# Patient Record
Sex: MALE | Race: BLACK OR AFRICAN AMERICAN
[De-identification: names, ages, dates, MRNs, and addresses within clinical notes are randomized per-mention and may not be internally consistent; named-entity substitution may affect disease eponyms.]

---

## 2019-01-15 ENCOUNTER — HOSPITAL ENCOUNTER (INPATIENT)
Dept: HOSPITAL 15 - ER | Age: 54
LOS: 6 days | Discharge: HOME | DRG: 280 | End: 2019-01-21
Attending: INTERNAL MEDICINE | Admitting: NURSE PRACTITIONER
Payer: COMMERCIAL

## 2019-01-15 VITALS — BODY MASS INDEX: 33.59 KG/M2 | WEIGHT: 209 LBS | HEIGHT: 66 IN

## 2019-01-15 VITALS — DIASTOLIC BLOOD PRESSURE: 69 MMHG | SYSTOLIC BLOOD PRESSURE: 147 MMHG

## 2019-01-15 VITALS — SYSTOLIC BLOOD PRESSURE: 147 MMHG | DIASTOLIC BLOOD PRESSURE: 67 MMHG

## 2019-01-15 DIAGNOSIS — Z87.891: ICD-10-CM

## 2019-01-15 DIAGNOSIS — I21.A1: Primary | ICD-10-CM

## 2019-01-15 DIAGNOSIS — I25.10: ICD-10-CM

## 2019-01-15 DIAGNOSIS — J96.00: ICD-10-CM

## 2019-01-15 DIAGNOSIS — Z82.49: ICD-10-CM

## 2019-01-15 DIAGNOSIS — I08.1: ICD-10-CM

## 2019-01-15 DIAGNOSIS — Z91.19: ICD-10-CM

## 2019-01-15 DIAGNOSIS — Z79.82: ICD-10-CM

## 2019-01-15 DIAGNOSIS — E11.65: ICD-10-CM

## 2019-01-15 DIAGNOSIS — I50.43: ICD-10-CM

## 2019-01-15 DIAGNOSIS — I48.91: ICD-10-CM

## 2019-01-15 DIAGNOSIS — J10.00: ICD-10-CM

## 2019-01-15 DIAGNOSIS — Z95.810: ICD-10-CM

## 2019-01-15 DIAGNOSIS — I27.20: ICD-10-CM

## 2019-01-15 DIAGNOSIS — E78.5: ICD-10-CM

## 2019-01-15 DIAGNOSIS — I25.2: ICD-10-CM

## 2019-01-15 DIAGNOSIS — I42.0: ICD-10-CM

## 2019-01-15 DIAGNOSIS — I11.0: ICD-10-CM

## 2019-01-15 DIAGNOSIS — Z79.899: ICD-10-CM

## 2019-01-15 LAB
ALBUMIN SERPL-MCNC: 3.7 G/DL (ref 3.4–5)
ALP SERPL-CCNC: 58 U/L (ref 45–117)
ALT SERPL-CCNC: 25 U/L (ref 16–61)
ANION GAP SERPL CALCULATED.3IONS-SCNC: 7 MMOL/L (ref 5–15)
APTT PPP: 30.4 SEC (ref 23.78–33.04)
BILIRUB SERPL-MCNC: 0.7 MG/DL (ref 0.2–1)
BUN SERPL-MCNC: 10 MG/DL (ref 7–18)
BUN/CREAT SERPL: 8.1
CALCIUM SERPL-MCNC: 8.7 MG/DL (ref 8.5–10.1)
CHLORIDE SERPL-SCNC: 102 MMOL/L (ref 98–107)
CO2 SERPL-SCNC: 29 MMOL/L (ref 21–32)
GLUCOSE SERPL-MCNC: 223 MG/DL (ref 74–106)
HCT VFR BLD AUTO: 51.1 % (ref 41–53)
HGB BLD-MCNC: 17.6 G/DL (ref 13.5–17.5)
INR PPP: 0.99 (ref 0.9–1.15)
MAGNESIUM SERPL-MCNC: 2 MG/DL (ref 1.6–2.6)
MCH RBC QN AUTO: 34.5 PG (ref 28–32)
MCV RBC AUTO: 100.2 FL (ref 80–100)
POTASSIUM SERPL-SCNC: 3.4 MMOL/L (ref 3.5–5.1)
PROT SERPL-MCNC: 8.1 G/DL (ref 6.4–8.2)
PROTHROMBIN TIME: 10.6 SEC (ref 9.27–12.13)
SODIUM SERPL-SCNC: 138 MMOL/L (ref 136–145)

## 2019-01-15 PROCEDURE — 36600 WITHDRAWAL OF ARTERIAL BLOOD: CPT

## 2019-01-15 PROCEDURE — 81001 URINALYSIS AUTO W/SCOPE: CPT

## 2019-01-15 PROCEDURE — 85025 COMPLETE CBC W/AUTO DIFF WBC: CPT

## 2019-01-15 PROCEDURE — 85730 THROMBOPLASTIN TIME PARTIAL: CPT

## 2019-01-15 PROCEDURE — 80053 COMPREHEN METABOLIC PANEL: CPT

## 2019-01-15 PROCEDURE — 87186 SC STD MICRODIL/AGAR DIL: CPT

## 2019-01-15 PROCEDURE — 80307 DRUG TEST PRSMV CHEM ANLYZR: CPT

## 2019-01-15 PROCEDURE — 96361 HYDRATE IV INFUSION ADD-ON: CPT

## 2019-01-15 PROCEDURE — 71045 X-RAY EXAM CHEST 1 VIEW: CPT

## 2019-01-15 PROCEDURE — 93005 ELECTROCARDIOGRAM TRACING: CPT

## 2019-01-15 PROCEDURE — 85610 PROTHROMBIN TIME: CPT

## 2019-01-15 PROCEDURE — 87040 BLOOD CULTURE FOR BACTERIA: CPT

## 2019-01-15 PROCEDURE — 87077 CULTURE AEROBIC IDENTIFY: CPT

## 2019-01-15 PROCEDURE — 96375 TX/PRO/DX INJ NEW DRUG ADDON: CPT

## 2019-01-15 PROCEDURE — 87205 SMEAR GRAM STAIN: CPT

## 2019-01-15 PROCEDURE — 84484 ASSAY OF TROPONIN QUANT: CPT

## 2019-01-15 PROCEDURE — 85007 BL SMEAR W/DIFF WBC COUNT: CPT

## 2019-01-15 PROCEDURE — 80048 BASIC METABOLIC PNL TOTAL CA: CPT

## 2019-01-15 PROCEDURE — 85027 COMPLETE CBC AUTOMATED: CPT

## 2019-01-15 PROCEDURE — 94640 AIRWAY INHALATION TREATMENT: CPT

## 2019-01-15 PROCEDURE — 83605 ASSAY OF LACTIC ACID: CPT

## 2019-01-15 PROCEDURE — 99291 CRITICAL CARE FIRST HOUR: CPT

## 2019-01-15 PROCEDURE — 87070 CULTURE OTHR SPECIMN AEROBIC: CPT

## 2019-01-15 PROCEDURE — 83036 HEMOGLOBIN GLYCOSYLATED A1C: CPT

## 2019-01-15 PROCEDURE — 36415 COLL VENOUS BLD VENIPUNCTURE: CPT

## 2019-01-15 PROCEDURE — 82805 BLOOD GASES W/O2 SATURATION: CPT

## 2019-01-15 PROCEDURE — 93306 TTE W/DOPPLER COMPLETE: CPT

## 2019-01-15 PROCEDURE — 80061 LIPID PANEL: CPT

## 2019-01-15 PROCEDURE — 87804 INFLUENZA ASSAY W/OPTIC: CPT

## 2019-01-15 PROCEDURE — 96365 THER/PROPH/DIAG IV INF INIT: CPT

## 2019-01-15 PROCEDURE — 83735 ASSAY OF MAGNESIUM: CPT

## 2019-01-15 PROCEDURE — 83880 ASSAY OF NATRIURETIC PEPTIDE: CPT

## 2019-01-15 PROCEDURE — 80162 ASSAY OF DIGOXIN TOTAL: CPT

## 2019-01-15 PROCEDURE — 82962 GLUCOSE BLOOD TEST: CPT

## 2019-01-15 RX ADMIN — Medication SCH STRIP: at 23:25

## 2019-01-15 RX ADMIN — TEMAZEPAM PRN MG: 15 CAPSULE ORAL at 23:35

## 2019-01-15 RX ADMIN — HUMAN INSULIN SCH UNITS: 100 INJECTION, SOLUTION SUBCUTANEOUS at 23:30

## 2019-01-15 RX ADMIN — FAMOTIDINE SCH MG: 20 TABLET, FILM COATED ORAL at 23:24

## 2019-01-15 RX ADMIN — ACETAMINOPHEN PRN MG: 325 TABLET ORAL at 23:24

## 2019-01-15 NOTE — NUR
Telemetry admit from ER

BHUPENDRA VALDEZ admitted to Telemetry unit.  Patient oriented to Karlene Salcido RN primary RN, 
unit, room, bed, and unit policies regarding patient care and visiting hours. Patient now on 
continuous telemetry monitoring, tele box # 8 sinus tachy 116. Patient placed on bedside 
oxygen 3L NC, weighed by bedscale and encouraged to call if they need something. All 
questions and concerns addressed, patient verbalized understanding. 

Note:

## 2019-01-16 VITALS — SYSTOLIC BLOOD PRESSURE: 134 MMHG | DIASTOLIC BLOOD PRESSURE: 87 MMHG

## 2019-01-16 VITALS — SYSTOLIC BLOOD PRESSURE: 150 MMHG | DIASTOLIC BLOOD PRESSURE: 90 MMHG

## 2019-01-16 VITALS — SYSTOLIC BLOOD PRESSURE: 117 MMHG | DIASTOLIC BLOOD PRESSURE: 74 MMHG

## 2019-01-16 VITALS — DIASTOLIC BLOOD PRESSURE: 90 MMHG | SYSTOLIC BLOOD PRESSURE: 141 MMHG

## 2019-01-16 VITALS — DIASTOLIC BLOOD PRESSURE: 74 MMHG | SYSTOLIC BLOOD PRESSURE: 117 MMHG

## 2019-01-16 VITALS — SYSTOLIC BLOOD PRESSURE: 141 MMHG | DIASTOLIC BLOOD PRESSURE: 90 MMHG

## 2019-01-16 VITALS — SYSTOLIC BLOOD PRESSURE: 140 MMHG | DIASTOLIC BLOOD PRESSURE: 82 MMHG

## 2019-01-16 LAB
ALBUMIN SERPL-MCNC: 3.4 G/DL (ref 3.4–5)
ALCOHOL, URINE: < 3 MG/DL (ref 0–5)
ALP SERPL-CCNC: 52 U/L (ref 45–117)
ALT SERPL-CCNC: 28 U/L (ref 16–61)
AMPHETAMINES UR QL SCN: NEGATIVE
ANION GAP SERPL CALCULATED.3IONS-SCNC: 6 MMOL/L (ref 5–15)
BARBITURATES UR QL SCN: NEGATIVE
BENZODIAZ UR QL SCN: NEGATIVE
BILIRUB SERPL-MCNC: 0.6 MG/DL (ref 0.2–1)
BUN SERPL-MCNC: 14 MG/DL (ref 7–18)
BUN/CREAT SERPL: 11
BZE UR QL SCN: NEGATIVE
CALCIUM SERPL-MCNC: 8.5 MG/DL (ref 8.5–10.1)
CANNABINOIDS UR QL SCN: NEGATIVE
CHLORIDE SERPL-SCNC: 102 MMOL/L (ref 98–107)
CO2 SERPL-SCNC: 29 MMOL/L (ref 21–32)
GLUCOSE SERPL-MCNC: 171 MG/DL (ref 74–106)
HCT VFR BLD AUTO: 46.7 % (ref 41–53)
HGB BLD-MCNC: 16.3 G/DL (ref 13.5–17.5)
MCH RBC QN AUTO: 35 PG (ref 28–32)
MCV RBC AUTO: 100.2 FL (ref 80–100)
NRBC BLD QL AUTO: 0 %
OPIATES UR QL SCN: NEGATIVE
PCP UR QL SCN: NEGATIVE
POTASSIUM SERPL-SCNC: 4 MMOL/L (ref 3.5–5.1)
PROT SERPL-MCNC: 7.5 G/DL (ref 6.4–8.2)
SODIUM SERPL-SCNC: 137 MMOL/L (ref 136–145)

## 2019-01-16 RX ADMIN — HYDROCODONE BITARTRATE AND ACETAMINOPHEN PRN TAB: 5; 325 TABLET ORAL at 22:38

## 2019-01-16 RX ADMIN — Medication SCH STRIP: at 12:10

## 2019-01-16 RX ADMIN — ACETAMINOPHEN PRN MG: 325 TABLET ORAL at 05:23

## 2019-01-16 RX ADMIN — ALBUTEROL SULFATE PRN MG: 2.5 SOLUTION RESPIRATORY (INHALATION) at 06:02

## 2019-01-16 RX ADMIN — ENOXAPARIN SODIUM SCH MG: 80 INJECTION SUBCUTANEOUS at 22:44

## 2019-01-16 RX ADMIN — ATORVASTATIN CALCIUM SCH MG: 20 TABLET, FILM COATED ORAL at 22:37

## 2019-01-16 RX ADMIN — HUMAN INSULIN SCH UNITS: 100 INJECTION, SOLUTION SUBCUTANEOUS at 12:11

## 2019-01-16 RX ADMIN — LISINOPRIL SCH MG: 10 TABLET ORAL at 09:56

## 2019-01-16 RX ADMIN — HUMAN INSULIN SCH UNITS: 100 INJECTION, SOLUTION SUBCUTANEOUS at 17:33

## 2019-01-16 RX ADMIN — HUMAN INSULIN SCH UNITS: 100 INJECTION, SOLUTION SUBCUTANEOUS at 06:01

## 2019-01-16 RX ADMIN — Medication SCH STRIP: at 17:33

## 2019-01-16 RX ADMIN — TEMAZEPAM PRN MG: 15 CAPSULE ORAL at 22:38

## 2019-01-16 RX ADMIN — OSELTAMIVIR PHOSPHATE SCH MG: 75 CAPSULE ORAL at 22:37

## 2019-01-16 RX ADMIN — METOPROLOL TARTRATE SCH MG: 25 TABLET, FILM COATED ORAL at 22:38

## 2019-01-16 RX ADMIN — Medication SCH STRIP: at 06:01

## 2019-01-16 RX ADMIN — FAMOTIDINE SCH MG: 20 TABLET, FILM COATED ORAL at 22:40

## 2019-01-16 RX ADMIN — LEVOFLOXACIN SCH MLS/HR: 5 INJECTION, SOLUTION INTRAVENOUS at 09:54

## 2019-01-16 RX ADMIN — FAMOTIDINE SCH MG: 20 TABLET, FILM COATED ORAL at 09:55

## 2019-01-16 RX ADMIN — ALBUTEROL SULFATE PRN MG: 2.5 SOLUTION RESPIRATORY (INHALATION) at 15:02

## 2019-01-16 NOTE — NUR
RECEIVED A CALL FROM [facility] TO REPORT INFLUENZA A POSITIVE, PAGED HOSPITALIST TO 
INFORMED POSITIVE RESULTS, PLACED PT ON ISOLATION, AWAITING CALL BACK.

## 2019-01-16 NOTE — NUR
RT at bedside

Patient states pain has been relieved with pain medication, sob at 5L NC, improved. Will 
continue to monitor, medicated as ordered. RT to administer ordered breathing treatment at 
this time

## 2019-01-16 NOTE — NUR
RECEIVED A CALL FROM R.T. TO REPORT CRITICAL LAB VALUE OF PO2 44.8, CALLED AND SPOKE TO DR. ANGELES AND INFORMED HER REGARDING THE CRITICAL LAB VALUE. NO FURTHER ORDERS RECEIVED.

## 2019-01-16 NOTE — NUR
PRN MED NEB ASSESSMENT. PT DENIES SOB. NO DISTRESS NOTED AT THIS TIME. POX 95% ON 4L NC HR 
100 RR 20 BS ARE DIMINISHED IN LOWER LOBES. TX NOT GIVEN.

## 2019-01-16 NOTE — NUR
MD Called/paged

Hospitalist called re:positive blood cultures- gram positive cocci in clusters . Waiting for 
call back. Continue care.

## 2019-01-16 NOTE — NUR
RECEIVED A CALL FROM ADAM / RUSSELL  [phone], ADAM SPOKE TO PT, PT REQUESTED 
TO STAY AT THIS HOSPITAL FOR TODAY, AS PER ADAM PT IS OK TO STAY TODAY AND HE WILL CALL CASE 
MANAGER TOMORROW TO SEE IF PT WANTS TO STAY HERE FOR THE REST OF HIS VISIT OR TRANSFER TO 
[facility].

## 2019-01-16 NOTE — NUR
MD Called/paged

Hospitalist called re:sob at rest on 5L NC, wheezing and rales, patient requesting breathing 
treatment . Waiting for call back. Continue care.

## 2019-01-16 NOTE — NUR
TRANSFER HELD:  [facility]EN[facility] RUSSELL BUT ALSO HAS MEDICARE AS SECONDARY, SO; HE HAS A CHOICE OF STAYING 
AND ADAM RUSSELL CM SPOKE WITH PT AND ADAM STATED [facility]

## 2019-01-16 NOTE — NUR
RECEIVED CALL BACK FROM DR. GONZALEZ, REPORTED POSITIVE INFLUENZA A, ORDERS RECEIVED TO PLACE PT 
ON ISOLATION AND TO ORDER TAMIFLU.

## 2019-01-17 VITALS — SYSTOLIC BLOOD PRESSURE: 89 MMHG | DIASTOLIC BLOOD PRESSURE: 67 MMHG

## 2019-01-17 VITALS — DIASTOLIC BLOOD PRESSURE: 72 MMHG | SYSTOLIC BLOOD PRESSURE: 112 MMHG

## 2019-01-17 VITALS — DIASTOLIC BLOOD PRESSURE: 71 MMHG | SYSTOLIC BLOOD PRESSURE: 105 MMHG

## 2019-01-17 VITALS — DIASTOLIC BLOOD PRESSURE: 67 MMHG | SYSTOLIC BLOOD PRESSURE: 100 MMHG

## 2019-01-17 VITALS — SYSTOLIC BLOOD PRESSURE: 108 MMHG | DIASTOLIC BLOOD PRESSURE: 74 MMHG

## 2019-01-17 LAB
ANION GAP SERPL CALCULATED.3IONS-SCNC: 7 MMOL/L (ref 5–15)
BUN SERPL-MCNC: 24 MG/DL (ref 7–18)
BUN/CREAT SERPL: 20.2
CALCIUM SERPL-MCNC: 8.3 MG/DL (ref 8.5–10.1)
CHLORIDE SERPL-SCNC: 100 MMOL/L (ref 98–107)
CHOLEST SERPL-MCNC: 172 MG/DL (ref ?–200)
CO2 SERPL-SCNC: 27 MMOL/L (ref 21–32)
GLUCOSE SERPL-MCNC: 160 MG/DL (ref 74–106)
HCT VFR BLD AUTO: 46.7 % (ref 41–53)
HDLC SERPL-MCNC: 52 MG/DL (ref 40–59)
HGB BLD-MCNC: 15.9 G/DL (ref 13.5–17.5)
MAGNESIUM SERPL-MCNC: 2.3 MG/DL (ref 1.6–2.6)
MCH RBC QN AUTO: 34.3 PG (ref 28–32)
MCV RBC AUTO: 100.4 FL (ref 80–100)
NRBC BLD QL AUTO: 0.1 %
POTASSIUM SERPL-SCNC: 3.7 MMOL/L (ref 3.5–5.1)
SODIUM SERPL-SCNC: 134 MMOL/L (ref 136–145)
TRIGL SERPL-MCNC: 148 MG/DL (ref ?–150)

## 2019-01-17 RX ADMIN — Medication SCH STRIP: at 22:37

## 2019-01-17 RX ADMIN — TEMAZEPAM PRN MG: 15 CAPSULE ORAL at 22:35

## 2019-01-17 RX ADMIN — HUMAN INSULIN SCH UNITS: 100 INJECTION, SOLUTION SUBCUTANEOUS at 12:24

## 2019-01-17 RX ADMIN — HUMAN INSULIN SCH UNITS: 100 INJECTION, SOLUTION SUBCUTANEOUS at 18:00

## 2019-01-17 RX ADMIN — Medication SCH STRIP: at 00:27

## 2019-01-17 RX ADMIN — LEVOFLOXACIN SCH MLS/HR: 5 INJECTION, SOLUTION INTRAVENOUS at 09:27

## 2019-01-17 RX ADMIN — METOPROLOL TARTRATE SCH MG: 25 TABLET, FILM COATED ORAL at 10:00

## 2019-01-17 RX ADMIN — ENOXAPARIN SODIUM SCH MG: 80 INJECTION SUBCUTANEOUS at 09:28

## 2019-01-17 RX ADMIN — HUMAN INSULIN SCH UNITS: 100 INJECTION, SOLUTION SUBCUTANEOUS at 05:27

## 2019-01-17 RX ADMIN — FUROSEMIDE SCH MG: 10 INJECTION, SOLUTION INTRAMUSCULAR; INTRAVENOUS at 09:32

## 2019-01-17 RX ADMIN — Medication SCH STRIP: at 12:28

## 2019-01-17 RX ADMIN — ALBUTEROL SULFATE PRN MG: 2.5 SOLUTION RESPIRATORY (INHALATION) at 19:20

## 2019-01-17 RX ADMIN — HUMAN INSULIN SCH UNITS: 100 INJECTION, SOLUTION SUBCUTANEOUS at 00:27

## 2019-01-17 RX ADMIN — OSELTAMIVIR PHOSPHATE SCH MG: 75 CAPSULE ORAL at 09:28

## 2019-01-17 RX ADMIN — ALBUTEROL SULFATE PRN MG: 2.5 SOLUTION RESPIRATORY (INHALATION) at 08:05

## 2019-01-17 RX ADMIN — HYDROCODONE BITARTRATE AND ACETAMINOPHEN PRN TAB: 5; 325 TABLET ORAL at 22:35

## 2019-01-17 RX ADMIN — Medication SCH STRIP: at 17:59

## 2019-01-17 RX ADMIN — FAMOTIDINE SCH MG: 20 TABLET, FILM COATED ORAL at 09:28

## 2019-01-17 RX ADMIN — ATORVASTATIN CALCIUM SCH MG: 20 TABLET, FILM COATED ORAL at 22:34

## 2019-01-17 RX ADMIN — SPIRONOLACTONE SCH MG: 25 TABLET, FILM COATED ORAL at 17:59

## 2019-01-17 RX ADMIN — HUMAN INSULIN SCH UNITS: 100 INJECTION, SOLUTION SUBCUTANEOUS at 22:37

## 2019-01-17 RX ADMIN — OSELTAMIVIR PHOSPHATE SCH MG: 75 CAPSULE ORAL at 22:34

## 2019-01-17 RX ADMIN — LISINOPRIL SCH MG: 10 TABLET ORAL at 09:50

## 2019-01-17 RX ADMIN — CARVEDILOL SCH MG: 3.12 TABLET, FILM COATED ORAL at 22:34

## 2019-01-17 RX ADMIN — Medication SCH STRIP: at 05:27

## 2019-01-17 RX ADMIN — METOPROLOL TARTRATE SCH MG: 25 TABLET, FILM COATED ORAL at 09:31

## 2019-01-17 RX ADMIN — HYDROCODONE BITARTRATE AND ACETAMINOPHEN PRN TAB: 5; 325 TABLET ORAL at 05:26

## 2019-01-17 NOTE — NUR
MD returned call

Hospitalist Bentley returned call, updated on reason for call-positive blood cultures and 
current antibiotics, no new orders received at this time, states he will review chart.  
Continue care.

## 2019-01-18 VITALS — SYSTOLIC BLOOD PRESSURE: 122 MMHG | DIASTOLIC BLOOD PRESSURE: 75 MMHG

## 2019-01-18 VITALS — SYSTOLIC BLOOD PRESSURE: 104 MMHG | DIASTOLIC BLOOD PRESSURE: 70 MMHG

## 2019-01-18 VITALS — DIASTOLIC BLOOD PRESSURE: 74 MMHG | SYSTOLIC BLOOD PRESSURE: 128 MMHG

## 2019-01-18 VITALS — SYSTOLIC BLOOD PRESSURE: 104 MMHG | DIASTOLIC BLOOD PRESSURE: 48 MMHG

## 2019-01-18 VITALS — DIASTOLIC BLOOD PRESSURE: 72 MMHG | SYSTOLIC BLOOD PRESSURE: 115 MMHG

## 2019-01-18 VITALS — SYSTOLIC BLOOD PRESSURE: 121 MMHG | DIASTOLIC BLOOD PRESSURE: 75 MMHG

## 2019-01-18 LAB
ANION GAP SERPL CALCULATED.3IONS-SCNC: 8 MMOL/L (ref 5–15)
BUN SERPL-MCNC: 25 MG/DL (ref 7–18)
BUN/CREAT SERPL: 22.7
CALCIUM SERPL-MCNC: 8.5 MG/DL (ref 8.5–10.1)
CHLORIDE SERPL-SCNC: 98 MMOL/L (ref 98–107)
CO2 SERPL-SCNC: 27 MMOL/L (ref 21–32)
GLUCOSE SERPL-MCNC: 98 MG/DL (ref 74–106)
POTASSIUM SERPL-SCNC: 3.5 MMOL/L (ref 3.5–5.1)
SODIUM SERPL-SCNC: 133 MMOL/L (ref 136–145)

## 2019-01-18 RX ADMIN — CARVEDILOL SCH MG: 3.12 TABLET, FILM COATED ORAL at 10:01

## 2019-01-18 RX ADMIN — HUMAN INSULIN SCH UNITS: 100 INJECTION, SOLUTION SUBCUTANEOUS at 06:00

## 2019-01-18 RX ADMIN — OSELTAMIVIR PHOSPHATE SCH MG: 75 CAPSULE ORAL at 23:05

## 2019-01-18 RX ADMIN — HUMAN INSULIN SCH UNITS: 100 INJECTION, SOLUTION SUBCUTANEOUS at 17:48

## 2019-01-18 RX ADMIN — Medication SCH STRIP: at 17:47

## 2019-01-18 RX ADMIN — LISINOPRIL SCH MG: 10 TABLET ORAL at 10:00

## 2019-01-18 RX ADMIN — OSELTAMIVIR PHOSPHATE SCH MG: 75 CAPSULE ORAL at 10:00

## 2019-01-18 RX ADMIN — ALBUTEROL SULFATE PRN MG: 2.5 SOLUTION RESPIRATORY (INHALATION) at 16:32

## 2019-01-18 RX ADMIN — SPIRONOLACTONE SCH MG: 25 TABLET, FILM COATED ORAL at 17:47

## 2019-01-18 RX ADMIN — ATORVASTATIN CALCIUM SCH MG: 20 TABLET, FILM COATED ORAL at 23:04

## 2019-01-18 RX ADMIN — HUMAN INSULIN SCH UNITS: 100 INJECTION, SOLUTION SUBCUTANEOUS at 23:06

## 2019-01-18 RX ADMIN — HUMAN INSULIN SCH UNITS: 100 INJECTION, SOLUTION SUBCUTANEOUS at 13:01

## 2019-01-18 RX ADMIN — ALBUTEROL SULFATE PRN MG: 2.5 SOLUTION RESPIRATORY (INHALATION) at 20:15

## 2019-01-18 RX ADMIN — CARVEDILOL SCH MG: 3.12 TABLET, FILM COATED ORAL at 23:04

## 2019-01-18 RX ADMIN — Medication SCH STRIP: at 13:02

## 2019-01-18 RX ADMIN — FUROSEMIDE SCH MG: 10 INJECTION, SOLUTION INTRAMUSCULAR; INTRAVENOUS at 10:04

## 2019-01-18 RX ADMIN — Medication SCH STRIP: at 23:06

## 2019-01-18 RX ADMIN — HYDROCODONE BITARTRATE AND ACETAMINOPHEN PRN TAB: 5; 325 TABLET ORAL at 23:06

## 2019-01-18 RX ADMIN — DIGOXIN SCH MG: 250 TABLET ORAL at 10:00

## 2019-01-18 RX ADMIN — TEMAZEPAM PRN MG: 15 CAPSULE ORAL at 23:06

## 2019-01-18 RX ADMIN — SPIRONOLACTONE SCH MG: 25 TABLET, FILM COATED ORAL at 06:46

## 2019-01-18 RX ADMIN — Medication SCH STRIP: at 06:47

## 2019-01-18 RX ADMIN — HYDROCODONE BITARTRATE AND ACETAMINOPHEN PRN TAB: 5; 325 TABLET ORAL at 10:02

## 2019-01-18 NOTE — NUR
Patient sitting up in bed in no obvious distress. Nasal canula off, oxygen saturation was 
88%. With oxygen saturation went up to 95%. Encouraged patient to keep oxygen on.

## 2019-01-19 VITALS — DIASTOLIC BLOOD PRESSURE: 87 MMHG | SYSTOLIC BLOOD PRESSURE: 131 MMHG

## 2019-01-19 VITALS — SYSTOLIC BLOOD PRESSURE: 126 MMHG | DIASTOLIC BLOOD PRESSURE: 77 MMHG

## 2019-01-19 VITALS — DIASTOLIC BLOOD PRESSURE: 77 MMHG | SYSTOLIC BLOOD PRESSURE: 126 MMHG

## 2019-01-19 VITALS — SYSTOLIC BLOOD PRESSURE: 115 MMHG | DIASTOLIC BLOOD PRESSURE: 70 MMHG

## 2019-01-19 VITALS — DIASTOLIC BLOOD PRESSURE: 88 MMHG | SYSTOLIC BLOOD PRESSURE: 122 MMHG

## 2019-01-19 VITALS — DIASTOLIC BLOOD PRESSURE: 76 MMHG | SYSTOLIC BLOOD PRESSURE: 129 MMHG

## 2019-01-19 LAB
ANION GAP SERPL CALCULATED.3IONS-SCNC: 7 MMOL/L (ref 5–15)
BUN SERPL-MCNC: 23 MG/DL (ref 7–18)
BUN/CREAT SERPL: 22.1
CALCIUM SERPL-MCNC: 8.6 MG/DL (ref 8.5–10.1)
CHLORIDE SERPL-SCNC: 99 MMOL/L (ref 98–107)
CO2 SERPL-SCNC: 30 MMOL/L (ref 21–32)
GLUCOSE SERPL-MCNC: 147 MG/DL (ref 74–106)
MAGNESIUM SERPL-MCNC: 2.4 MG/DL (ref 1.6–2.6)
POTASSIUM SERPL-SCNC: 3.6 MMOL/L (ref 3.5–5.1)
SODIUM SERPL-SCNC: 136 MMOL/L (ref 136–145)

## 2019-01-19 RX ADMIN — Medication SCH STRIP: at 18:01

## 2019-01-19 RX ADMIN — CARVEDILOL SCH MG: 3.12 TABLET, FILM COATED ORAL at 10:09

## 2019-01-19 RX ADMIN — OSELTAMIVIR PHOSPHATE SCH MG: 75 CAPSULE ORAL at 10:21

## 2019-01-19 RX ADMIN — LISINOPRIL SCH MG: 10 TABLET ORAL at 10:00

## 2019-01-19 RX ADMIN — HYDROCODONE BITARTRATE AND ACETAMINOPHEN PRN TAB: 5; 325 TABLET ORAL at 22:17

## 2019-01-19 RX ADMIN — SPIRONOLACTONE SCH MG: 25 TABLET, FILM COATED ORAL at 17:59

## 2019-01-19 RX ADMIN — SPIRONOLACTONE SCH MG: 25 TABLET, FILM COATED ORAL at 06:50

## 2019-01-19 RX ADMIN — FUROSEMIDE SCH MG: 10 INJECTION, SOLUTION INTRAMUSCULAR; INTRAVENOUS at 10:08

## 2019-01-19 RX ADMIN — ALBUTEROL SULFATE PRN MG: 2.5 SOLUTION RESPIRATORY (INHALATION) at 09:32

## 2019-01-19 RX ADMIN — OSELTAMIVIR PHOSPHATE SCH MG: 75 CAPSULE ORAL at 22:17

## 2019-01-19 RX ADMIN — CARVEDILOL SCH MG: 3.12 TABLET, FILM COATED ORAL at 22:17

## 2019-01-19 RX ADMIN — DIGOXIN SCH MG: 250 TABLET ORAL at 10:09

## 2019-01-19 RX ADMIN — HUMAN INSULIN SCH UNITS: 100 INJECTION, SOLUTION SUBCUTANEOUS at 22:18

## 2019-01-19 RX ADMIN — Medication SCH STRIP: at 22:18

## 2019-01-19 RX ADMIN — Medication SCH STRIP: at 06:51

## 2019-01-19 RX ADMIN — ATORVASTATIN CALCIUM SCH MG: 20 TABLET, FILM COATED ORAL at 22:17

## 2019-01-19 RX ADMIN — HUMAN INSULIN SCH UNITS: 100 INJECTION, SOLUTION SUBCUTANEOUS at 06:50

## 2019-01-19 RX ADMIN — Medication SCH STRIP: at 12:49

## 2019-01-19 RX ADMIN — HUMAN INSULIN SCH UNITS: 100 INJECTION, SOLUTION SUBCUTANEOUS at 17:58

## 2019-01-19 RX ADMIN — HYDROCODONE BITARTRATE AND ACETAMINOPHEN PRN TAB: 5; 325 TABLET ORAL at 17:59

## 2019-01-19 RX ADMIN — POTASSIUM CHLORIDE SCH MEQ: 750 TABLET, FILM COATED, EXTENDED RELEASE ORAL at 10:21

## 2019-01-19 RX ADMIN — HYDROCODONE BITARTRATE AND ACETAMINOPHEN PRN TAB: 5; 325 TABLET ORAL at 10:10

## 2019-01-19 RX ADMIN — HUMAN INSULIN SCH UNITS: 100 INJECTION, SOLUTION SUBCUTANEOUS at 12:48

## 2019-01-19 NOTE — NUR
PRN MED NEB ASSESSMENT. PT DENIES SOB. NO DISTRESS NOTED AT THIS TIME. POX 97% ON 3 L NC HR 
80 RR 18. BS ARE FAINT CRACKLES IN BASES. TX NOT GIVEN.

## 2019-01-19 NOTE — NUR
Opening shift Note



Pt is resting in bed with eyes open and resp rate is even and unlabored. No s/s of any 
distress noted at this time. Pt lung sounds are still with crackles in bilateral bases and 
rhonchi in bilat upper lobes. Pt states that he is feeling much better than when he came 
into the hospital. Pt remains on Iso for Influenza A positive protocol. Bed is low, wheels 
are locked, and call light is with in reach.

## 2019-01-19 NOTE — NUR
NUTRITION ASSESSMENT NOTES



Please refer to link notes of nutrition screen form filed under the intervention section of 
the plan of care for further details.



Est. Needs: 1550 kcal to 1850 kcal (25-30 kcal/kgBW), 62 gms to 75 gms pro (1.0-1.2 
gms/kgBW). Will continue to monitor pertinent labs and reassess nutrient needs prn



Thank you.


-------------------------------------------------------------------------------

Addendum: 01/19/19 at 1430 by Misty Dorsey RD

-------------------------------------------------------------------------------

Amended: Links added.

## 2019-01-20 VITALS — DIASTOLIC BLOOD PRESSURE: 73 MMHG | SYSTOLIC BLOOD PRESSURE: 121 MMHG

## 2019-01-20 VITALS — DIASTOLIC BLOOD PRESSURE: 74 MMHG | SYSTOLIC BLOOD PRESSURE: 150 MMHG

## 2019-01-20 VITALS — SYSTOLIC BLOOD PRESSURE: 125 MMHG | DIASTOLIC BLOOD PRESSURE: 67 MMHG

## 2019-01-20 VITALS — SYSTOLIC BLOOD PRESSURE: 127 MMHG | DIASTOLIC BLOOD PRESSURE: 69 MMHG

## 2019-01-20 VITALS — SYSTOLIC BLOOD PRESSURE: 98 MMHG | DIASTOLIC BLOOD PRESSURE: 52 MMHG

## 2019-01-20 RX ADMIN — HUMAN INSULIN SCH UNITS: 100 INJECTION, SOLUTION SUBCUTANEOUS at 06:15

## 2019-01-20 RX ADMIN — TEMAZEPAM PRN MG: 15 CAPSULE ORAL at 21:42

## 2019-01-20 RX ADMIN — CARVEDILOL SCH MG: 3.12 TABLET, FILM COATED ORAL at 21:44

## 2019-01-20 RX ADMIN — Medication SCH STRIP: at 18:15

## 2019-01-20 RX ADMIN — ATORVASTATIN CALCIUM SCH MG: 20 TABLET, FILM COATED ORAL at 21:59

## 2019-01-20 RX ADMIN — HYDROCODONE BITARTRATE AND ACETAMINOPHEN PRN TAB: 5; 325 TABLET ORAL at 21:42

## 2019-01-20 RX ADMIN — POTASSIUM CHLORIDE SCH MEQ: 750 TABLET, FILM COATED, EXTENDED RELEASE ORAL at 09:39

## 2019-01-20 RX ADMIN — ALBUTEROL SULFATE PRN MG: 2.5 SOLUTION RESPIRATORY (INHALATION) at 18:35

## 2019-01-20 RX ADMIN — HUMAN INSULIN SCH UNITS: 100 INJECTION, SOLUTION SUBCUTANEOUS at 12:39

## 2019-01-20 RX ADMIN — FUROSEMIDE SCH MG: 10 INJECTION, SOLUTION INTRAMUSCULAR; INTRAVENOUS at 09:39

## 2019-01-20 RX ADMIN — SPIRONOLACTONE SCH MG: 25 TABLET, FILM COATED ORAL at 06:15

## 2019-01-20 RX ADMIN — OSELTAMIVIR PHOSPHATE SCH MG: 75 CAPSULE ORAL at 09:40

## 2019-01-20 RX ADMIN — Medication SCH STRIP: at 12:40

## 2019-01-20 RX ADMIN — DIGOXIN SCH MG: 250 TABLET ORAL at 09:41

## 2019-01-20 RX ADMIN — SPIRONOLACTONE SCH MG: 25 TABLET, FILM COATED ORAL at 18:15

## 2019-01-20 RX ADMIN — CARVEDILOL SCH MG: 3.12 TABLET, FILM COATED ORAL at 09:40

## 2019-01-20 RX ADMIN — Medication SCH STRIP: at 06:16

## 2019-01-20 RX ADMIN — LISINOPRIL SCH MG: 10 TABLET ORAL at 10:00

## 2019-01-20 RX ADMIN — OSELTAMIVIR PHOSPHATE SCH MG: 75 CAPSULE ORAL at 21:41

## 2019-01-20 RX ADMIN — HYDROCODONE BITARTRATE AND ACETAMINOPHEN PRN TAB: 5; 325 TABLET ORAL at 06:16

## 2019-01-20 RX ADMIN — HUMAN INSULIN SCH UNITS: 100 INJECTION, SOLUTION SUBCUTANEOUS at 18:15

## 2019-01-20 NOTE — NUR
Respiratory note: PATIENT ASSESSED FOR PRN MED-NEB, HE DENIES NEED AT THIS TIME AND DOES NOT 
APPEAR TO BE IN ANY ACUTE RESPIRATORY DISTRESS. HE WAS INSTRUCTED TO CALL FOR RT IF HE FELT 
THE NEED FOR TX AT A LATER TIME. 

SPO2 93% 3LPM N/C.

## 2019-01-20 NOTE — NUR
Opening  shift note

Patient in bed alert and oriented x 4, verbally coherent, able to make needs known. 
Patient's respiration even and unlabored, denies pain and discomfort at this time. Plan of 
care discussed, patient verbalized understanding. All needs attended, will continue to 
monitor.

## 2019-01-21 VITALS — SYSTOLIC BLOOD PRESSURE: 121 MMHG | DIASTOLIC BLOOD PRESSURE: 69 MMHG

## 2019-01-21 VITALS — SYSTOLIC BLOOD PRESSURE: 130 MMHG | DIASTOLIC BLOOD PRESSURE: 73 MMHG

## 2019-01-21 VITALS — DIASTOLIC BLOOD PRESSURE: 82 MMHG | SYSTOLIC BLOOD PRESSURE: 142 MMHG

## 2019-01-21 LAB
ANION GAP SERPL CALCULATED.3IONS-SCNC: 5 MMOL/L (ref 5–15)
BUN SERPL-MCNC: 20 MG/DL (ref 7–18)
BUN/CREAT SERPL: 20.6
CALCIUM SERPL-MCNC: 9.5 MG/DL (ref 8.5–10.1)
CHLORIDE SERPL-SCNC: 104 MMOL/L (ref 98–107)
CO2 SERPL-SCNC: 28 MMOL/L (ref 21–32)
GLUCOSE SERPL-MCNC: 125 MG/DL (ref 74–106)
POTASSIUM SERPL-SCNC: 3.6 MMOL/L (ref 3.5–5.1)
SODIUM SERPL-SCNC: 137 MMOL/L (ref 136–145)

## 2019-01-21 RX ADMIN — Medication SCH STRIP: at 05:30

## 2019-01-21 RX ADMIN — FUROSEMIDE SCH MG: 10 INJECTION, SOLUTION INTRAMUSCULAR; INTRAVENOUS at 10:49

## 2019-01-21 RX ADMIN — HUMAN INSULIN SCH UNITS: 100 INJECTION, SOLUTION SUBCUTANEOUS at 11:51

## 2019-01-21 RX ADMIN — CARVEDILOL SCH MG: 3.12 TABLET, FILM COATED ORAL at 10:50

## 2019-01-21 RX ADMIN — LISINOPRIL SCH MG: 10 TABLET ORAL at 10:51

## 2019-01-21 RX ADMIN — Medication SCH STRIP: at 11:51

## 2019-01-21 RX ADMIN — HUMAN INSULIN SCH UNITS: 100 INJECTION, SOLUTION SUBCUTANEOUS at 05:31

## 2019-01-21 RX ADMIN — DIGOXIN SCH MG: 250 TABLET ORAL at 10:50

## 2019-01-21 RX ADMIN — HUMAN INSULIN SCH UNITS: 100 INJECTION, SOLUTION SUBCUTANEOUS at 00:35

## 2019-01-21 RX ADMIN — HYDROCODONE BITARTRATE AND ACETAMINOPHEN PRN TAB: 5; 325 TABLET ORAL at 05:32

## 2019-01-21 RX ADMIN — Medication SCH STRIP: at 00:35

## 2019-01-21 RX ADMIN — SPIRONOLACTONE SCH MG: 25 TABLET, FILM COATED ORAL at 05:30

## 2019-01-21 RX ADMIN — POTASSIUM CHLORIDE SCH MEQ: 750 TABLET, FILM COATED, EXTENDED RELEASE ORAL at 10:50

## 2019-01-21 RX ADMIN — OSELTAMIVIR PHOSPHATE SCH MG: 75 CAPSULE ORAL at 10:50

## 2019-01-21 NOTE — NUR
ROUNDS

Dr Hernández at bedside for rounds, new orders received and followed through. Patient updated 
on plan of care, verbalized understanding, continue care.

## 2019-01-21 NOTE — NUR
Opening Shift Note

Assumed care of patient, awake, alert and oriented X4. No S/S of distress/SOB or pain. Tele# 
8, sinus rhythm # 76 bpm. IV to right forearm, 20 gauge, patent and saline locked. Patient 
remains in droplet isolation for positive Influenza A. Instructed on POC and to call for 
assist PRN, verbalized understanding. Bed locked, in lowest position, call light within 
reach, will continue to monitor for changes Q1hr and PRN.

## 2019-01-21 NOTE — NUR
Discharge instructions given as ordered. Encourage to follow up with PMD as instructed. All 
questions and concerns addressed. Patient verbalized understanding. Medication 
reconciliation form completed and copy given to patient. IV removed with catheter intact, 
pressure dressing applied. Telemetry unit returned to ICU. Patient taken to vehicle via 
wheelchair with all personal belongings, accompanied by staff and family member. No distress 
noted at time of departure.

## 2022-11-01 NOTE — NUR
RECEIVED PT RESTING IN BED, CALL LIGHT WITHIN REACH, ECHOCARDIOGRAM IN PROCESS, NO PAIN OR 
DISTRESS NOTED OR REPORTED AT THIS TIME, WILL CONTINUE TO MONITOR PT. [Follow-Up: _____] : a [unfilled] follow-up visit